# Patient Record
Sex: MALE | Race: WHITE | NOT HISPANIC OR LATINO | Employment: FULL TIME | URBAN - METROPOLITAN AREA
[De-identification: names, ages, dates, MRNs, and addresses within clinical notes are randomized per-mention and may not be internally consistent; named-entity substitution may affect disease eponyms.]

---

## 2018-05-03 ENCOUNTER — NON-PROVIDER VISIT (OUTPATIENT)
Dept: URGENT CARE | Facility: PHYSICIAN GROUP | Age: 33
End: 2018-05-03

## 2018-05-03 DIAGNOSIS — Z02.1 PRE-EMPLOYMENT DRUG SCREENING: ICD-10-CM

## 2018-05-03 LAB
AMP AMPHETAMINE: NORMAL
COC COCAINE: NORMAL
INT CON NEG: NEGATIVE
INT CON POS: POSITIVE
MET METHAMPHETAMINES: NORMAL
OPI OPIATES: NORMAL
PCP PHENCYCLIDINE: NORMAL
POC DRUG COMMENT 753798-OCCUPATIONAL HEALTH: NORMAL
THC: NORMAL

## 2018-05-03 PROCEDURE — 80305 DRUG TEST PRSMV DIR OPT OBS: CPT | Performed by: EMERGENCY MEDICINE

## 2019-12-17 ENCOUNTER — OFFICE VISIT (OUTPATIENT)
Dept: URGENT CARE | Facility: PHYSICIAN GROUP | Age: 34
End: 2019-12-17
Payer: COMMERCIAL

## 2019-12-17 VITALS
WEIGHT: 183 LBS | RESPIRATION RATE: 16 BRPM | HEART RATE: 65 BPM | HEIGHT: 68 IN | SYSTOLIC BLOOD PRESSURE: 104 MMHG | TEMPERATURE: 97.6 F | OXYGEN SATURATION: 99 % | BODY MASS INDEX: 27.74 KG/M2 | DIASTOLIC BLOOD PRESSURE: 76 MMHG

## 2019-12-17 DIAGNOSIS — J06.9 VIRAL URI WITH COUGH: ICD-10-CM

## 2019-12-17 DIAGNOSIS — J02.8 VIRAL SORE THROAT: ICD-10-CM

## 2019-12-17 DIAGNOSIS — B97.89 VIRAL SORE THROAT: ICD-10-CM

## 2019-12-17 LAB
INT CON NEG: NORMAL
INT CON POS: NORMAL
S PYO AG THROAT QL: NEGATIVE

## 2019-12-17 PROCEDURE — 99204 OFFICE O/P NEW MOD 45 MIN: CPT | Performed by: PHYSICIAN ASSISTANT

## 2019-12-17 PROCEDURE — 87880 STREP A ASSAY W/OPTIC: CPT | Performed by: PHYSICIAN ASSISTANT

## 2019-12-17 RX ORDER — FLUTICASONE PROPIONATE 50 MCG
1 SPRAY, SUSPENSION (ML) NASAL DAILY
Qty: 16 G | Refills: 0 | Status: SHIPPED | OUTPATIENT
Start: 2019-12-17 | End: 2021-07-06

## 2019-12-17 RX ORDER — BENZONATATE 100 MG/1
100 CAPSULE ORAL 3 TIMES DAILY PRN
Qty: 60 CAP | Refills: 0 | Status: SHIPPED | OUTPATIENT
Start: 2019-12-17 | End: 2021-07-06

## 2019-12-17 ASSESSMENT — ENCOUNTER SYMPTOMS
COUGH: 1
WHEEZING: 1
SORE THROAT: 1
TROUBLE SWALLOWING: 0
HEADACHES: 0
MYALGIAS: 0
EYE DISCHARGE: 0
VOMITING: 0
FEVER: 0
EYE REDNESS: 0
NAUSEA: 0
SHORTNESS OF BREATH: 0

## 2019-12-17 NOTE — PROGRESS NOTES
Subjective:      Quincy Omer is a 34 y.o. male who presents with Cough (x4days dry cough, congestion, sinus , sore throat)        Cough   This is a new problem. The problem has been unchanged. The problem occurs constantly. The cough is non-productive. Associated symptoms include nasal congestion, a sore throat and wheezing (intermittent). Pertinent negatives include no chest pain, ear pain, eye redness, fever, headaches, myalgias, rash or shortness of breath. Nothing aggravates the symptoms. He has tried OTC cough suppressant for the symptoms. The treatment provided mild relief. There is no history of asthma.   Pharyngitis    This is a new problem. Episode onset: x 4 days ago. Neither side of throat is experiencing more pain than the other. There has been no fever. Associated symptoms include congestion and coughing. Pertinent negatives include no drooling, ear pain, headaches, shortness of breath, trouble swallowing or vomiting. Treatments tried: OTC cough/cold medications.     PMH:  has no past medical history on file.  MEDS: No current outpatient medications on file.  ALLERGIES: No Known Allergies  SURGHX: No past surgical history on file.  SOCHX:  reports that he has quit smoking. He smoked 0.00 packs per day. He has never used smokeless tobacco. He reports current alcohol use.  FH: Family history was reviewed, no pertinent findings to report    Review of Systems   Constitutional: Negative for fever.   HENT: Positive for congestion and sore throat. Negative for drooling, ear pain and trouble swallowing.    Eyes: Negative for discharge and redness.   Respiratory: Positive for cough and wheezing (intermittent). Negative for shortness of breath.    Cardiovascular: Negative for chest pain and leg swelling.   Gastrointestinal: Negative for nausea and vomiting.   Musculoskeletal: Negative for myalgias.   Skin: Negative for rash.   Neurological: Negative for headaches.   All other systems reviewed and are  "negative.         Objective:     /76   Pulse 65   Temp 36.4 °C (97.6 °F) (Temporal)   Resp 16   Ht 1.727 m (5' 8\")   Wt 83 kg (183 lb)   SpO2 99%   BMI 27.83 kg/m²      Physical Exam  Constitutional:       Appearance: Normal appearance.   HENT:      Head: Normocephalic and atraumatic.      Right Ear: Tympanic membrane, ear canal and external ear normal.      Left Ear: Tympanic membrane, ear canal and external ear normal.      Nose: Nose normal.      Mouth/Throat:      Mouth: Mucous membranes are moist.      Pharynx: Oropharynx is clear. Uvula midline. Posterior oropharyngeal erythema present.      Tonsils: No tonsillar exudate.   Eyes:      Extraocular Movements: Extraocular movements intact.      Conjunctiva/sclera: Conjunctivae normal.   Neck:      Musculoskeletal: Normal range of motion and neck supple.   Cardiovascular:      Rate and Rhythm: Normal rate and regular rhythm.      Heart sounds: Normal heart sounds.   Pulmonary:      Effort: Pulmonary effort is normal. No respiratory distress.      Breath sounds: Normal breath sounds. No wheezing.   Musculoskeletal: Normal range of motion.   Skin:     General: Skin is warm and dry.   Neurological:      Mental Status: He is alert and oriented to person, place, and time.            Progress:  POCT Rapid Strep: Negative      Assessment/Plan:     1. Viral URI with cough  - benzonatate (TESSALON) 100 MG Cap; Take 1 Cap by mouth 3 times a day as needed for Cough.  Dispense: 60 Cap; Refill: 0  - fluticasone (FLONASE) 50 MCG/ACT nasal spray; Spray 1 Spray in nose every day.  Dispense: 16 g; Refill: 0    2. Viral sore throat  - POCT Rapid Strep A    Differential diagnoses, supportive care, and indications for immediate follow-up discussed with patient.   Instructed to return to clinic or nearest emergency department for any change in condition, further concerns, or worsening of symptoms.    OTC Tylenol or Motrin for fever/discomfort.  OTC cough/cold medication " for symptomatic relief  OTC Supportive Care for Congestion - saline nasal spray or neti pot  OTC Supportive Care for Sore Throat - warm salt water gargles, sore throat lozenges, warm lemon water, and/or tea.  Drink plenty of fluids  Follow-up with PCP  Work note provided   Return to clinic or go to the ED if symptoms worsen or fail to improve, or if the patient should develop worsening/increasing cough, congestion, ear pain, sore throat, difficulty swallowing, drooling, change in voice, shortness of breath, wheezing, chest pain, fever/chills, and/or any concerning symptoms.    Discussed plan with the patient, and he agrees to the above.

## 2019-12-17 NOTE — LETTER
[facility] [address] [address]     December 17, 2019    Patient: Quincy Omer   YOB: 1985   Date of Visit: 12/17/2019       To Whom It May Concern:    Quincy Omer was seen and treated in our department on 12/17/2019. Please excuse him from work 12/16-12/18.    Sincerely,     Ethel Cunha P.A.-C.

## 2020-09-15 ENCOUNTER — NON-PROVIDER VISIT (OUTPATIENT)
Dept: URGENT CARE | Facility: CLINIC | Age: 35
End: 2020-09-15

## 2020-09-15 DIAGNOSIS — Z02.1 PRE-EMPLOYMENT DRUG SCREENING: ICD-10-CM

## 2020-09-15 LAB
AMP AMPHETAMINE: NORMAL
BAR BARBITURATES: NORMAL
BZO BENZODIAZEPINES: NORMAL
COC COCAINE: NORMAL
INT CON NEG: NEGATIVE
INT CON POS: POSITIVE
MDMA ECSTASY: NORMAL
MET METHAMPHETAMINES: NORMAL
MTD METHADONE: NORMAL
OPI OPIATES: NORMAL
OXY OXYCODONE: NORMAL
PCP PHENCYCLIDINE: NORMAL
POC URINE DRUG SCREEN OCDRS: NORMAL
THC: NORMAL

## 2020-09-15 PROCEDURE — 80305 DRUG TEST PRSMV DIR OPT OBS: CPT | Performed by: PHYSICIAN ASSISTANT

## 2020-09-21 ENCOUNTER — NON-PROVIDER VISIT (OUTPATIENT)
Dept: OCCUPATIONAL MEDICINE | Facility: CLINIC | Age: 35
End: 2020-09-21

## 2020-09-21 DIAGNOSIS — Z02.83 ENCOUNTER FOR DRUG SCREENING: ICD-10-CM

## 2020-09-21 PROCEDURE — 8911 PR MRO FEE: Performed by: NURSE PRACTITIONER

## 2020-11-11 ENCOUNTER — OFFICE VISIT (OUTPATIENT)
Dept: URGENT CARE | Facility: PHYSICIAN GROUP | Age: 35
End: 2020-11-11
Payer: COMMERCIAL

## 2020-11-11 ENCOUNTER — OCCUPATIONAL MEDICINE (OUTPATIENT)
Dept: URGENT CARE | Facility: PHYSICIAN GROUP | Age: 35
End: 2020-11-11
Payer: COMMERCIAL

## 2020-11-11 VITALS
SYSTOLIC BLOOD PRESSURE: 100 MMHG | DIASTOLIC BLOOD PRESSURE: 82 MMHG | TEMPERATURE: 97.6 F | OXYGEN SATURATION: 97 % | HEIGHT: 68 IN | BODY MASS INDEX: 27.28 KG/M2 | WEIGHT: 180 LBS | HEART RATE: 56 BPM

## 2020-11-11 DIAGNOSIS — S61.011A LACERATION OF RIGHT THUMB WITHOUT FOREIGN BODY WITHOUT DAMAGE TO NAIL, INITIAL ENCOUNTER: ICD-10-CM

## 2020-11-11 DIAGNOSIS — S65.411A LACERATION OF BLOOD VESSEL OF RIGHT THUMB, INITIAL ENCOUNTER: ICD-10-CM

## 2020-11-11 PROCEDURE — 99202 OFFICE O/P NEW SF 15 MIN: CPT | Performed by: FAMILY MEDICINE

## 2020-11-11 ASSESSMENT — ENCOUNTER SYMPTOMS
TINGLING: 0
FOCAL WEAKNESS: 0
SENSORY CHANGE: 0

## 2020-11-11 NOTE — PATIENT INSTRUCTIONS
Suture Removal  You have had your sutures (stitches) removed today. This means your wound has healed well enough to take out your stitches. Be careful to protect the wound area over the next several weeks. An injury this area could cause the cut to split open again. It usually takes 1-2 years for a scar to get its full strength and loose its redness. For wounds that heal slowly, tapes may be applied to reinforce the skin for several days after the stitches are removed.  You may allow the sutured area to get wet. Topical antibiotics (antibiotics you put on your skin) are not usually needed at this point. Applying vitamin E oil and aloe vera ointments may help the wound heal faster and stronger. Some scars form extra pigment with exposure to sunlight during the first 6-12 months after repair. This can be prevented by using a sun block (SPF 15-30) on the affected area. Call your doctor if you have any concerns about your injury. Call right away if you have any evidence of wound infection such as increased pain, drainage, redness, or swelling.  Document Released: 01/25/2006 Document Revised: 03/11/2013 Document Reviewed: 10/09/2009  MyWave® Patient Information ©2013 [employer].

## 2020-11-11 NOTE — LETTER
EMPLOYEE’S CLAIM FOR COMPENSATION/ REPORT OF INITIAL TREATMENT  FORM C-4    EMPLOYEE’S CLAIM - PROVIDE ALL INFORMATION REQUESTED   First Name  uQincy Last Name  [place] Birthdate                    1985                Sex  male Claim Number   Home Address  [address] Age  35 y.o. Height   Weight   SSN     [facility] Zip  [address] Telephone  [phone] (home)    Mailing Address  [address] [facility] Zip  [address] Primary Language Spoken  English    Insurer   Third Party   [employer]   Employee's Occupation (Job Title) When Injury or Occupational Disease Occurred      Employer's Name  [employer]  Telephone  [phone]    Employer Address  [address]  [place]  Zip  [address]    Date of Injury  10/30/2020               Hour of Injury  7:30 PM Date Employer Notified  10/30/2020 Last Day of Work after Injury     or Occupational Disease  11/11/2020 Supervisor to Whom Injury     Reported  Oscar   Address or Location of Accident (if applicable)  [[facility] [address]]   What were you doing at the time of accident? (if applicable)  deburring a piece of steel    How did this injury or occupational disease occur? (Be specific an answer in detail. Use additional sheet if necessary)  while I was deburring a piece of steel the end of the debur knife slipped and my hand rubbed against the steel cutting it   If you believe that you have an occupational disease, when did you first have knowledge of the disability and it relationship to your employment?  N/A Witnesses to the Accident  none      Nature of Injury or Occupational Disease  Laceration  Part(s) of Body Injured or Affected  Hand (R), Thumb (R), Thumb (R)    I certify that the above is true and correct to the best of my knowledge and that I have provided this information in order  to obtain the benefits of [place] or [place]  I hereby authorize any physician, chiropractor, surgeon, practitioner, or other person, any hospital, including [facility] or [facility], any medical service organization, any insurance company, or other institution or organization to release to each other, any medical or other information, including benefits paid or payable, pertinent to this injury or disease, except information relative to diagnosis, treatment and/or counseling for AIDS, psychological conditions, alcohol or controlled substances, for which I must give specific authorization.  A Photostat of this authorization shall be as valid as the original.     Date   Place   Employee’s Signature   THIS REPORT MUST BE COMPLETED AND MAILED WITHIN 3 WORKING DAYS OF TREATMENT   Place  [facility]  Name of Facility  [place]   Date  11/11/2020 Diagnosis  (S65.411A) Laceration of blood vessel of right thumb, initial encounter Is there evidence the injured employee was under the              influence of alcohol and/or another controlled substance at the time of accident?   Hour  11:03 AM Description of Injury or Disease  The encounter diagnosis was Laceration of blood vessel of right thumb, initial encounter. No   Treatment  Wound closure with suturing performed on 10/30/2020  Have you advised the patient to remain off work five days or     more? No   X-Ray Findings    Comments:Not applicable   If Yes   From Date  To Date      From information given by the employee, together with medical evidence, can you directly connect this injury or occupational disease as job incurred?  Yes If No Full Duty    Yes Modified Duty      Is additional medical care by a physician indicated?  No If Modified Duty, Specify any Limitations / Restrictions      Do you know of any previous injury or disease contributing  "to this condition or occupational disease?                            No   Date  11/11/2020 Print Doctor’s Name   [facility] I certify the employer’s copy of  this form was mailed on:   Address  [address] Insurer’s Use Only     [facility] Zip  [address]    Provider’s Tax ID Number  [id number] Telephone  Dept: [phone]      ASIF Blanc M.D.  Signature:     Degree          ORIGINAL-TREATING PHYSICIAN OR CHIROPRACTOR    PAGE 2-INSURER/TPA    PAGE 3-EMPLOYER    PAGE 4-EMPLOYEE        Form C-4 (rev.10/07)          BRIEF DESCRIPTION OF RIGHTS AND BENEFITS  (Pursuant to [place] 616C.050)    Notice of Injury or Occupational Disease (Incident Report Form C-1): If an injury or occupational disease (OD) arises out of and in the course of employment, you must provide written notice to your employer as soon as practicable, but no later than 7 days after the accident or OD. Your employer shall maintain a sufficient supply of the required forms.     Claim for Compensation (Form C-4): If medical treatment is sought, the form C-4 is available at the place of initial treatment. A completed \"Claim for Compensation\" (Form C-4) must be filed within 90 days after an accident or OD. The treating physician or chiropractor must, within 3 working days after treatment, complete and mail to the employer, the employer's insurer and third-party , the Claim for Compensation.     Medical Treatment: If you require medical treatment for your on-the-job injury or OD, you may be required to select a physician or chiropractor from a list provided by your workers’ compensation insurer, if it has contracted with an Organization for Managed Care (MCO) or Preferred Provider Organization (PPO) or providers of health care. If your employer has not entered into a contract with an MCO or PPO, you may select a physician or chiropractor from the Panel of Physicians and Chiropractors. Any medical costs related " to your industrial injury or OD will be paid by your insurer.     Temporary Total Disability (TTD): If your doctor has certified that you are unable to work for a period of at least 5 consecutive days, or 5 cumulative days in a 20-day period, or places restrictions on you that your employer does not accommodate, you may be entitled to TTD compensation.     Temporary Partial Disability (TPD): If the wage you receive upon reemployment is less than the compensation for TTD to which you are entitled, the insurer may be required to pay you TPD compensation to make up the difference. TPD can only be paid for a maximum of 24 months.     Permanent Partial Disability (PPD): When your medical condition is stable and there is an indication of a PPD as a result of your injury or OD, within 30 days, your insurer must arrange for an evaluation by a rating physician or chiropractor to determine the degree of your PPD. The amount of your PPD award depends on the date of injury, the results of the PPD evaluation and your age and wage.     Permanent Total Disability (PTD): If you are medically certified by a treating physician or chiropractor as permanently and totally disabled and have been granted a PTD status by your insurer, you are entitled to receive monthly benefits not to exceed 66 2/3% of your average monthly wage. The amount of your PTD payments is subject to reduction if you previously received a PPD award.     Vocational Rehabilitation Services: You may be eligible for vocational rehabilitation services if you are unable to return to the job due to a permanent physical impairment or permanent restrictions as a result of your injury or occupational disease.     Transportation and Per Yessi Reimbursement: You may be eligible for travel expenses and per yessi associated with medical treatment.     Reopening: You may be able to reopen your claim if your condition worsens after claim closure.     Appeal Process: If you disagree  with a written determination issued by the insurer or the insurer does not respond to your request, you may appeal to the Department of Administration, , by following the instructions contained in your determination letter. You must appeal the determination within 70 days from the date of the determination letter at [address], [address], [place], [place] [address], or [address], [address], [place], [place] [address]. If you disagree with the  decision, you may appeal to the Department of Administration, . You must file your appeal within 30 days from the date of the  decision letter at [address], [address], [place], [place] [address], or [address], [address], [place], [place] [address]. If you disagree with a decision of an , you may file a petition for judicial review with the District Court. You must do so within 30 days of the Appeal Officer’s decision. You may be represented by an  at your own expense or you may contact the [facility] for possible representation.     [place]  for Injured Workers (NAIW): If you disagree with a  decision, you may request that NAIW represent you without charge at an  Hearing. For information regarding denial of benefits, you may contact the [facility] at: [address], [address], [place] [address], [phone], or [address], [address], [place] [address], [phone]     To File a Complaint with the Division: If you wish to file a complaint with the  of the Division of Industrial Relations (DIR), please contact the Workers’ Compensation Section, [address], [place], [place] [address], telephone [phone], or [address], [place], [place] [address], telephone [phone].     For assistance with Workers’ Compensation Issues: You may contact the  Office of the Governor Consumer Health Assistance, [address] [place] [address], [address], [place] [address], Toll Free 1-769.854.7406, Web site: http://govcha.[facility].[email or web address]., E-mail [email or web address].              __________________________________________________________________                              ___________________         Employee Name / Signature                                                                                                                     Date                                                                                                                                                                                       D-2 (rev. 01/20)

## 2020-11-11 NOTE — LETTER
[facility]  [address]  [place] [address]  Phone:  [phone] - Fax:  [phone]   [facility] Progress Report and Disability Certification  Date of Service: 11/11/2020   No Show:  No  Date / Time of Next Visit:     Claim Information   Patient Name: Quincy Omer  Claim Number:     Employer: [employer]  Date of Injury: 10/30/2020     Insurer / TPA: Kleli Torrez  ID / SSN:     Occupation:   Diagnosis: The encounter diagnosis was Laceration of blood vessel of right thumb, initial encounter.    Medical Information   Related to Industrial Injury?      Subjective Complaints:  Date of injury 10/31/2020.  35-year-old messiness presents with a chief complaint of right thumb laceration sustained on 10/30/2020 while deburring a piece of steel and the deeper knife slipped and struck the dorsal aspect of the right thumb.  Employee was initially evaluated at the [facility] emergency department and wound closure was performed with 3 sutures on 10/30/2020.  Employee denies functional limitations use of the right hand or thumb at this time and has been performing full duty with no limitations.   Objective Findings: Right thumb: 1.5 cm dorsal laceration, clean, dry, 3 sutures intact.  Full range of motion to flexion and extension at the MCP and IP joint of the right thumb, neurovascular intact throughout.   Pre-Existing Condition(s):     Assessment:   Condition Improved    Status: Discharged /  MMI  Permanent Disability:     Plan:   Comments:Suture removal, discharge from care    Diagnostics:   Comments:Not applicable    Comments:       Disability Information   Status: Released to Full Duty    From:     Through:   Restrictions are:     Physical Restrictions   Sitting:    Standing:    Stooping:    Bending:      Squatting:    Walking:    Climbing:    Pushing:      Pulling:    Other:    Reaching Above Shoulder (L):   Reaching Above  Shoulder (R):       Reaching Below Shoulder (L):    Reaching Below Shoulder (R):      Not to exceed Weight Limits   Carrying(hrs):   Weight Limit(lb):   Lifting(hrs):   Weight  Limit(lb):     Comments:      Repetitive Actions   Hands: i.e. Fine Manipulations from Grasping:     Feet: i.e. Operating Foot Controls:     Driving / Operate Machinery:     [facility] Phys[facility] Signature:  Physician Name:     Clinic Name / Location: [facility] [address] [address] Clinic Phone Number: Dept: [phone]   Appointment Time: 11:05 Am Visit Start Time: 11:03 AM   Check-In Time:  11:01 Am Visit Discharge Time:     Original-Treating Physician or Chiropractor    Page 2-Insurer/TPA    Page 3-Employer    Page 4-Employee

## 2020-11-11 NOTE — PROCEDURES
Suture Removal    Date/Time: 11/11/2020 11:07 AM  Performed by: Glen Chun M.D.  Authorized by: Glen Chun M.D.   Location: Right dorsal thumb.  Wound Appearance: clean  Sutures Removed: 3  Patient tolerance: patient tolerated the procedure well with no immediate complications

## 2020-11-11 NOTE — PROGRESS NOTES
Subjective:   Quincy Omer is a 35 y.o. male who presents for No chief complaint on file.    Date of injury 10/31/2020.  35-year-old messiness presents with a chief complaint of right thumb laceration sustained on 10/30/2020 while deburring a piece of steel and the deeper knife slipped and struck the dorsal aspect of the right thumb.  Employee was initially evaluated at the [facility] emergency department and wound closure was performed with 3 sutures on 10/30/2020.  Employee denies functional limitations use of the right hand or thumb at this time and has been performing full duty with no limitations.   See the C4 and D 39 forms    PMH:  has no past medical history on file.  MEDS:   Current Outpatient Medications:   •  benzonatate (TESSALON) 100 MG Cap, Take 1 Cap by mouth 3 times a day as needed for Cough. (Patient not taking: Reported on 11/11/2020), Disp: 60 Cap, Rfl: 0  •  fluticasone (FLONASE) 50 MCG/ACT nasal spray, Spray 1 Spray in nose every day. (Patient not taking: Reported on 11/11/2020), Disp: 16 g, Rfl: 0  ALLERGIES: No Known Allergies  SURGHX: No past surgical history on file.  SOCHX:  reports that he has quit smoking. He smoked 0.00 packs per day. He has never used smokeless tobacco. He reports current alcohol use. He reports current drug use. Drug: Marijuana.  FH: No family history on file.  Review of Systems   Neurological: Negative for tingling, sensory change and focal weakness.   All other systems reviewed and are negative.       Objective:   There were no vitals taken for this visit.  Physical Exam  Vitals signs and nursing note reviewed.   Constitutional:       General: He is not in acute distress.     Appearance: He is well-developed.   HENT:      Head: Normocephalic and atraumatic.      Right Ear: External ear normal.      Left Ear: External ear normal.      Nose: Nose normal.      Mouth/Throat:      Mouth: Mucous membranes are moist.   Eyes:      Conjunctiva/sclera:  Conjunctivae normal.   Cardiovascular:      Rate and Rhythm: Normal rate.   Pulmonary:      Effort: Pulmonary effort is normal. No respiratory distress.      Breath sounds: Normal breath sounds.   Abdominal:      General: There is no distension.   Musculoskeletal: Normal range of motion.   Skin:     General: Skin is warm and dry.   Neurological:      General: No focal deficit present.      Mental Status: He is alert and oriented to person, place, and time. Mental status is at baseline.      Gait: Gait (gait at baseline) normal.   Psychiatric:         Judgment: Judgment normal.       Right thumb: 1.5 cm dorsal laceration, clean, dry, 3 sutures intact.  Full range of motion to flexion and extension at the MCP and IP joint of the right thumb, neurovascular intact throughout.   Assessment/Plan:   1. Laceration of blood vessel of right thumb, initial encounter  - Suture Removal    See the D 39 and C4 forms, release from care to full duty        Please note that this dictation was created using voice recognition software. I have worked with consultants from the vendor as well as technical experts from [facility] to optimize the interface. I have made every reasonable attempt to correct obvious errors, but I expect that there are errors of grammar and possibly content that I did not discover before finalizing the note.

## 2021-07-03 ENCOUNTER — OFFICE VISIT (OUTPATIENT)
Dept: URGENT CARE | Facility: PHYSICIAN GROUP | Age: 36
End: 2021-07-03
Payer: COMMERCIAL

## 2021-07-03 VITALS
BODY MASS INDEX: 26.37 KG/M2 | OXYGEN SATURATION: 95 % | WEIGHT: 174 LBS | HEART RATE: 62 BPM | DIASTOLIC BLOOD PRESSURE: 74 MMHG | HEIGHT: 68 IN | SYSTOLIC BLOOD PRESSURE: 128 MMHG | RESPIRATION RATE: 16 BRPM | TEMPERATURE: 97.4 F

## 2021-07-03 DIAGNOSIS — N28.89 LEFT KIDNEY MASS: ICD-10-CM

## 2021-07-03 DIAGNOSIS — R10.9 LEFT FLANK PAIN: ICD-10-CM

## 2021-07-03 DIAGNOSIS — R10.9 ABDOMINAL PAIN, UNSPECIFIED ABDOMINAL LOCATION: ICD-10-CM

## 2021-07-03 LAB
APPEARANCE UR: CLEAR
BILIRUB UR STRIP-MCNC: NEGATIVE MG/DL
COLOR UR AUTO: YELLOW
GLUCOSE UR STRIP.AUTO-MCNC: NEGATIVE MG/DL
KETONES UR STRIP.AUTO-MCNC: NEGATIVE MG/DL
LEUKOCYTE ESTERASE UR QL STRIP.AUTO: NEGATIVE
NITRITE UR QL STRIP.AUTO: NEGATIVE
PH UR STRIP.AUTO: 7 [PH] (ref 5–8)
PROT UR QL STRIP: NEGATIVE MG/DL
RBC UR QL AUTO: NEGATIVE
SP GR UR STRIP.AUTO: 1.01
UROBILINOGEN UR STRIP-MCNC: 0.2 MG/DL

## 2021-07-03 PROCEDURE — 81002 URINALYSIS NONAUTO W/O SCOPE: CPT | Performed by: PHYSICIAN ASSISTANT

## 2021-07-03 PROCEDURE — 99214 OFFICE O/P EST MOD 30 MIN: CPT | Performed by: PHYSICIAN ASSISTANT

## 2021-07-03 RX ORDER — HYDROXYZINE 50 MG/1
50 TABLET, FILM COATED ORAL 3 TIMES DAILY PRN
COMMUNITY

## 2021-07-03 RX ORDER — COVID-19 MOLECULAR TEST ASSAY
KIT MISCELLANEOUS
COMMUNITY
Start: 2021-05-25 | End: 2021-07-06

## 2021-07-03 RX ORDER — TRAZODONE HYDROCHLORIDE 100 MG/1
100 TABLET ORAL
COMMUNITY
Start: 2021-06-15

## 2021-07-03 RX ORDER — LORAZEPAM 1 MG/1
TABLET ORAL
COMMUNITY
Start: 2021-06-15 | End: 2021-07-06

## 2021-07-03 RX ORDER — SERTRALINE HYDROCHLORIDE 100 MG/1
TABLET, FILM COATED ORAL
COMMUNITY
Start: 2021-06-21

## 2021-07-05 ENCOUNTER — HOSPITAL ENCOUNTER (OUTPATIENT)
Dept: RADIOLOGY | Facility: MEDICAL CENTER | Age: 36
End: 2021-07-05
Attending: PHYSICIAN ASSISTANT
Payer: COMMERCIAL

## 2021-07-05 DIAGNOSIS — R10.9 ABDOMINAL PAIN, UNSPECIFIED ABDOMINAL LOCATION: ICD-10-CM

## 2021-07-05 PROCEDURE — 74176 CT ABD & PELVIS W/O CONTRAST: CPT

## 2021-07-06 ASSESSMENT — ENCOUNTER SYMPTOMS
SENSORY CHANGE: 0
FOCAL WEAKNESS: 0
SHORTNESS OF BREATH: 0
BACK PAIN: 1
ROS GI COMMENTS: NO FECAL INCONTINENCE
WEAKNESS: 0

## 2021-07-06 NOTE — PROGRESS NOTES
"Subjective:   Quincy Omer is a 36 y.o. male who presents for Flank Pain (kidney pain, x2 days. sharp and stabbing yesterday, dull today when taking in deep breaths. )      Patient is a 36-year-old male who presents for evaluation of left flank pain for 2 days.  He states that the pain started in the middle the night waking him up from sleep.  The pain comes and goes in intensity and today he feels much better.  He has no history of kidney stones or other back issues.  He has tried ibuprofen and Tylenol with mild relief.      Review of Systems   Respiratory: Negative for shortness of breath.    Cardiovascular: Negative for chest pain.   Gastrointestinal:        No fecal incontinence   Genitourinary:        No urinary retention/incontinance.   Musculoskeletal: Positive for back pain.   Neurological: Negative for sensory change, focal weakness and weakness.        Able to walk       Medications:    • benzonatate Caps  • fluticasone  • hydrOXYzine HCl Tabs  • ID Now COVID-19 Kit  • LORazepam Tabs  • sertraline Tabs  • traZODone Tabs    Allergies: Patient has no known allergies.    Problem List: Quincy Omer does not have a problem list on file.    Surgical History:  No past surgical history on file.    Past Social Hx: Quincy Omer  reports that he has quit smoking. He smoked 0.00 packs per day. He has never used smokeless tobacco. He reports current alcohol use. He reports current drug use. Drug: Marijuana.     Past Family Hx:  Quincy Omer family history is not on file.     Problem list, medications, and allergies reviewed by myself today in Epic.     Objective:     Blood Pressure 128/74   Pulse 62   Temperature 36.3 °C (97.4 °F) (Temporal)   Respiration 16   Height 1.727 m (5' 8\")   Weight 78.9 kg (174 lb)   Oxygen Saturation 95%   Body Mass Index 26.46 kg/m²     Physical Exam  Vitals reviewed.   Constitutional:       General: He is not in acute distress.     Appearance: He is " well-developed. He is not diaphoretic.   Cardiovascular:      Rate and Rhythm: Normal rate and regular rhythm.      Pulses: Normal pulses.           Dorsalis pedis pulses are 2+ on the right side and 2+ on the left side.        Posterior tibial pulses are 2+ on the right side and 2+ on the left side.      Heart sounds: Normal heart sounds.   Pulmonary:      Effort: Pulmonary effort is normal. No respiratory distress.      Breath sounds: Normal breath sounds. No wheezing or rales.   Chest:      Chest wall: No tenderness.   Abdominal:      General: Bowel sounds are normal. There is no distension.      Palpations: Abdomen is soft. There is no mass.      Tenderness: There is no abdominal tenderness. There is no guarding or rebound.      Hernia: No hernia is present.   Musculoskeletal:         General: Tenderness present. Normal range of motion.      Cervical back: Normal range of motion and neck supple.      Comments: No pain to palpation of the left flank.  No focal midline tenderness of the spine.  Flexion, extension, rotation and lateral bend of back limited by pain.     Skin:     General: Skin is warm and dry.      Capillary Refill: Capillary refill takes less than 2 seconds.      Findings: No rash.   Neurological:      Mental Status: He is alert and oriented to person, place, and time.      Sensory: No sensory deficit.      Motor: No abnormal muscle tone.      Coordination: Coordination normal.      Deep Tendon Reflexes: Reflexes normal.      Comments: Bilateral lower extremity strength and sensory intact.  Negative straight leg raise.  Knee and ankle reflexes intact.     Psychiatric:         Behavior: Behavior normal.         Thought Content: Thought content normal.         Judgment: Judgment normal.          7/5/2021 11:38 AM     HISTORY/REASON FOR EXAM:  Flank pain, kidney stone suspected; renal colic.  Left flank pain     TECHNIQUE/EXAM DESCRIPTION AND NUMBER OF VIEWS:  CT scan renal/colic without  contrast.     5 mm helical images of the abdomen and pelvis were obtained from the diaphragmatic domes through the pubic symphysis.     Low dose optimization technique was utilized for this CT exam including automated exposure control and adjustment of the mA and/or kV according to patient size.     COMPARISON: None.     FINDINGS:  Lung bases: Unremarkable.     Kidneys: There is abnormal contour of the left kidney with a subtle heterogeneous mass measuring approximately 4.1 x 4.1 x 3.8 cm. There is surrounding perinephric stranding. No hydronephrosis or hydroureter. No renal or ureteral calculi.     Bladder: Unremarkable     Liver: Unremarkable.     Spleen: The spleen measures approximately 14 cm. A splenule is in the left upper quadrant.     Adrenal glands: Unremarkable.     Pancreas: Unremarkable.     Biliary system: Unremarkable.     Bowel: Unremarkable. No pneumoperitoneum is identified.     Ascites: None.     Lymph nodes: No adenopathy.     Bones: Unremarkable.     Miscellaneous:     IMPRESSION:     1.  Subtle heterogeneous mass in the upper pole of the left kidney does not have attenuation characteristics suggestive of abscess. Findings are concerning for neoplasm. Dedicated renal protocol CT or MRI is recommended for further evaluation.     2.  No adenopathy is identified. No contour abnormalities appreciated in the left renal vein.     3.  Mild splenomegaly  Assessment/Plan:     Medical Decision Making/Comments     Patient is a 36-year-old male who presents for acute left flank pain for 2 days.  Exam is unremarkable UA is unremarkable.  Given that the pain woke him up in the middle the night a CT scan was done to rule out kidney stones.  No stones were found but unfortunately there appears to be a 4 x 4 by 4 cm mass on the left kidney.  We will refer to the intake oncology coordinator for further evaluation and transfer of care.   Diagnosis and associated orders     1. Left flank pain  POCT Urinalysis     REFERRAL TO INTAKE ONCOLOGY COORDINATOR   2. Abdominal pain, unspecified abdominal location  CT-RENAL COLIC EVALUATION(A/P W/O)    REFERRAL TO INTAKE ONCOLOGY COORDINATOR   3. Left kidney mass  REFERRAL TO INTAKE ONCOLOGY COORDINATOR            Billing note: at least 30 minutes was allotted and spent for face-to-face care with the patient as well as coordination of care such as preparing for the visit, obtaining/reviewing history, reconciling outside information, performing an exam/evaluation, reviewing unique external notes and test reuslts, ordering/interpreting diagnostics, ordering/administering treatments, re-evaluating the patient, collaborating/communicating with other healthcare professionals, developing a plan of care, counseling/educating the patient/caregivers/family members, updating the medical record, and ensuring accurate documentation.     Differential diagnosis, natural history, supportive care, and indications for immediate follow-up discussed.    Advised the patient to follow-up with the primary care physician for recheck, reevaluation, and consideration of further management.    Please note that this dictation was created using voice recognition software. I have made a reasonable attempt to correct obvious errors, but I expect that there are errors of grammar and possibly content that I did not discover before finalizing the note.

## 2021-07-12 ENCOUNTER — HOSPITAL ENCOUNTER (OUTPATIENT)
Dept: RADIOLOGY | Facility: MEDICAL CENTER | Age: 36
End: 2021-07-12
Attending: NURSE PRACTITIONER
Payer: COMMERCIAL

## 2021-07-12 ENCOUNTER — OFFICE VISIT (OUTPATIENT)
Dept: HEMATOLOGY ONCOLOGY | Facility: MEDICAL CENTER | Age: 36
End: 2021-07-12
Payer: COMMERCIAL

## 2021-07-12 VITALS
WEIGHT: 176.7 LBS | RESPIRATION RATE: 17 BRPM | HEART RATE: 66 BPM | TEMPERATURE: 98.5 F | SYSTOLIC BLOOD PRESSURE: 120 MMHG | OXYGEN SATURATION: 98 % | DIASTOLIC BLOOD PRESSURE: 74 MMHG | BODY MASS INDEX: 26.17 KG/M2 | HEIGHT: 69 IN

## 2021-07-12 DIAGNOSIS — N28.89 RENAL MASS: ICD-10-CM

## 2021-07-12 PROCEDURE — 99204 OFFICE O/P NEW MOD 45 MIN: CPT | Performed by: NURSE PRACTITIONER

## 2021-07-12 PROCEDURE — 74170 CT ABD WO CNTRST FLWD CNTRST: CPT

## 2021-07-12 PROCEDURE — 700117 HCHG RX CONTRAST REV CODE 255: Performed by: NURSE PRACTITIONER

## 2021-07-12 RX ADMIN — IOHEXOL 100 ML: 350 INJECTION, SOLUTION INTRAVENOUS at 16:30

## 2021-07-12 ASSESSMENT — ENCOUNTER SYMPTOMS
NAUSEA: 1
BACK PAIN: 1
SORE THROAT: 0
SHORTNESS OF BREATH: 0
DIAPHORESIS: 0
WHEEZING: 0
NERVOUS/ANXIOUS: 1
DIARRHEA: 0
ABDOMINAL PAIN: 0
FEVER: 0
COUGH: 1
VOMITING: 0
WEIGHT LOSS: 1
CHILLS: 1
PALPITATIONS: 0
SPUTUM PRODUCTION: 0
DEPRESSION: 1
CONSTIPATION: 0
DIZZINESS: 0
HEADACHES: 1
FLANK PAIN: 1

## 2021-07-12 ASSESSMENT — PATIENT HEALTH QUESTIONNAIRE - PHQ9
SUM OF ALL RESPONSES TO PHQ QUESTIONS 1-9: 5
5. POOR APPETITE OR OVEREATING: 0 - NOT AT ALL
CLINICAL INTERPRETATION OF PHQ2 SCORE: 2

## 2021-07-12 ASSESSMENT — PAIN SCALES - GENERAL: PAINLEVEL: 3=SLIGHT PAIN

## 2021-07-12 NOTE — PROGRESS NOTES
"Subjective:      Quincy Omer is a 36 y.o. male who presents with New Patient (IC New/kidney mass)          HPI    Patient referred to me, Intake Oncology Coordinator by Urgent Care Provider  for renal mass.  Patient is unaccompanied for today's visit.    Patient stated on 7/1 he was awakened from severe left back pain as well as left upper quadrant abdominal pain.  He stated that this pain was \"the worst pain in my life.\"  He was seen in urgent care on 7/3 for further evaluation due to concern for possible kidney stone.  He stated at the time he was seen in urgent care the pain had somewhat subsided, describing the pain now as a \"real bad ache.\"  He stated the pain is more on the left flank side, and every once well may have some discomfort in the left upper quadrant abdomen.  Urgent care provider sent patient for a CT renal colic evaluation which he completed on 7/5/2021.  That CT showed a subtle heterogeneous mass in the upper pole of the left kidney which did not have any characteristics is just an abscess.  The findings were concerning for a neoplasm and a dedicated renal protocol CT or MRI was recommended for further evaluation.  There was no adenopathy identified.  Patient did have mild splenomegaly.  A UA was completed which was found to be negative.  Patient was referred for further evaluation of the abnormal CT.  I personally reviewed the CT report as well as the images in detail, and reviewed both of them with the patient today.    Clinically patient does continue to have pain as discussed above.  He has noticed approximately 11 pound weight loss in the last 1-1/2 months which is unplanned.  He is noticing some chills more recently which is mild, as well as increase in fatigue.  He notes a mild cough, and some chest pain every once in a while.  He also notes of an underlying nausea, but is still able to eat.  Patient with history of depression and anxiety, and was recently seen in our local " psychiatric[facility]mental hospital approximately 1 month ago.  He is on antidepressive medication, prescribed to him by a psychiatrist.    Please see past medical and surgical history below.    Patient does note a family history of cancer in his maternal grandmother diagnosed with breast cancer.    Patient is a former cigarette smoker.  He quit in  where he smoked for approximately 15 years on average of three-quarter pack per day.  Patient is also a former chewing tobacco user, quit in .  Patient also is a former electronic cigarette user in which she did use nicotine and flavoring.  He does currently use marijuana.    No Known Allergies  Current Outpatient Medications on File Prior to Visit   Medication Sig Dispense Refill   • sertraline (ZOLOFT) 100 MG Tab      • traZODone (DESYREL) 100 MG Tab Take 100 mg by mouth.     • hydrOXYzine HCl (ATARAX) 50 MG Tab Take 50 mg by mouth 3 times a day as needed for Itching.       No current facility-administered medications on file prior to visit.     Past Medical History:   Diagnosis Date   • IBS (irritable bowel syndrome)      History reviewed. No pertinent surgical history.    Family History   Problem Relation Age of Onset   • Cancer Maternal Grandmother         Breast cancer       Social History     Socioeconomic History   • Marital status:      Spouse name: Not on file   • Number of children: Not on file   • Years of education: Not on file   • Highest education level: Not on file   Occupational History   • Not on file   Tobacco Use   • Smoking status: Former Smoker     Packs/day: 0.75     Years: 15.00     Pack years: 11.25     Types: Cigarettes     Quit date: 2016     Years since quittin.0   • Smokeless tobacco: Former User     Types: Chew     Quit date: 2010   Vaping Use   • Vaping Use: Former   • Substances: Nicotine, THC, Flavoring, Flavoring with the nicotine - no longer use nicotine vapes   Substance and Sexual Activity   • Alcohol use: Yes      Comment: occasionally   • Drug use: Yes     Types: Marijuana   • Sexual activity: Yes   Other Topics Concern   • Not on file   Social History Narrative         Social Determinants of Health     Financial Resource Strain:    • Difficulty of Paying Living Expenses:    Food Insecurity:    • Worried About Running Out of Food in the Last Year:    • Ran Out of Food in the Last Year:    Transportation Needs:    • Lack of Transportation (Medical):    • Lack of Transportation (Non-Medical):    Physical Activity:    • Days of Exercise per Week:    • Minutes of Exercise per Session:    Stress:    • Feeling of Stress :    Social Connections:    • Frequency of Communication with Friends and Family:    • Frequency of Social Gatherings with Friends and Family:    • Attends Zoroastrianism Services:    • Active Member of Clubs or Organizations:    • Attends Club or Organization Meetings:    • Marital Status:    Intimate Partner Violence:    • Fear of Current or Ex-Partner:    • Emotionally Abused:    • Physically Abused:    • Sexually Abused:          Review of Systems   Constitutional: Positive for chills (mild ), malaise/fatigue and weight loss (11 pounds in 1.5 months - unplanned). Negative for diaphoresis and fever.   HENT: Positive for congestion. Negative for sore throat.    Respiratory: Positive for cough (mild). Negative for sputum production, shortness of breath and wheezing.    Cardiovascular: Positive for chest pain. Negative for palpitations.   Gastrointestinal: Positive for nausea. Negative for abdominal pain (resolved LUQ pain), constipation, diarrhea and vomiting.   Genitourinary: Positive for flank pain (left ). Negative for dysuria and hematuria.   Musculoskeletal: Positive for back pain (left back/flank pain).   Skin: Positive for itching (Athletes foot - improving with Tinactin). Negative for rash.   Neurological: Positive for headaches. Negative for dizziness.   Psychiatric/Behavioral: Positive for  "depression. The patient is nervous/anxious.           Objective:     /74   Pulse 66   Temp 36.9 °C (98.5 °F) (Temporal)   Resp 17   Ht 1.753 m (5' 9\")   Wt 80.2 kg (176 lb 11.2 oz)   SpO2 98%   BMI 26.09 kg/m²      Physical Exam  Vitals reviewed.   Constitutional:       General: He is not in acute distress.     Appearance: Normal appearance. He is well-developed. He is not diaphoretic.   HENT:      Head: Normocephalic and atraumatic.   Eyes:      General: No scleral icterus.        Right eye: No discharge.         Left eye: No discharge.      Conjunctiva/sclera: Conjunctivae normal.      Pupils: Pupils are equal, round, and reactive to light.   Neck:      Thyroid: No thyromegaly.   Cardiovascular:      Rate and Rhythm: Normal rate and regular rhythm.      Pulses: Normal pulses.      Heart sounds: Normal heart sounds. No murmur heard.   No friction rub. No gallop.    Pulmonary:      Effort: Pulmonary effort is normal. No respiratory distress.      Breath sounds: Normal breath sounds. No wheezing.   Abdominal:      General: Bowel sounds are normal. There is no distension.      Palpations: Abdomen is soft.      Tenderness: There is abdominal tenderness (minimal discomfort upon palpation of the LUQ).   Musculoskeletal:         General: No tenderness. Normal range of motion.      Cervical back: Normal range of motion and neck supple. No muscular tenderness.   Lymphadenopathy:      Head:      Right side of head: No submental, submandibular, tonsillar, preauricular, posterior auricular or occipital adenopathy.      Left side of head: No submental, submandibular, tonsillar, preauricular, posterior auricular or occipital adenopathy.      Cervical: No cervical adenopathy.      Upper Body:      Right upper body: No supraclavicular adenopathy.      Left upper body: No supraclavicular adenopathy.   Skin:     General: Skin is warm and dry.      Coloration: Skin is not pale.      Findings: No erythema or rash. "   Neurological:      Mental Status: He is alert and oriented to person, place, and time.   Psychiatric:         Mood and Affect: Mood normal.         Behavior: Behavior normal.         CT-RENAL COLIC EVALUATION(A/P W/O)    Result Date: 7/5/2021 7/5/2021 11:38 AM HISTORY/REASON FOR EXAM:  Flank pain, kidney stone suspected; renal colic. Left flank pain TECHNIQUE/EXAM DESCRIPTION AND NUMBER OF VIEWS: CT scan renal/colic without contrast. 5 mm helical images of the abdomen and pelvis were obtained from the diaphragmatic domes through the pubic symphysis. Low dose optimization technique was utilized for this CT exam including automated exposure control and adjustment of the mA and/or kV according to patient size. COMPARISON: None. FINDINGS: Lung bases: Unremarkable. Kidneys: There is abnormal contour of the left kidney with a subtle heterogeneous mass measuring approximately 4.1 x 4.1 x 3.8 cm. There is surrounding perinephric stranding. No hydronephrosis or hydroureter. No renal or ureteral calculi. Bladder: Unremarkable Liver: Unremarkable. Spleen: The spleen measures approximately 14 cm. A splenule is in the left upper quadrant. Adrenal glands: Unremarkable. Pancreas: Unremarkable. Biliary system: Unremarkable. Bowel: Unremarkable. No pneumoperitoneum is identified. Ascites: None. Lymph nodes: No adenopathy. Bones: Unremarkable. Miscellaneous:     1.  Subtle heterogeneous mass in the upper pole of the left kidney does not have attenuation characteristics suggestive of abscess. Findings are concerning for neoplasm. Dedicated renal protocol CT or MRI is recommended for further evaluation. 2.  No adenopathy is identified. No contour abnormalities appreciated in the left renal vein. 3.  Mild splenomegaly          Assessment/Plan:       1. Renal mass  CT-RENAL WITH & W/O    REFERRAL TO UROLOGY       Patient noted to have a subtle heterogeneous mass in the upper pole of the left kidney.  According to the reading  radiologist this is concerning for neoplasm, however it was recommended that a dedicated renal protocol CT be completed for further evaluation.  I discussed the recommendation in detail with the patient today, and requested a CT renal protocol imaging.  Discussed with patient should this come back concerning for renal cell carcinoma, recommendation would be to refer patient to urology for further evaluation.  I will contact patient via phone once CT is completed to determine further plan of care.  Patient did verbalize understanding and is in agreement with the plan.    Recommend that patient establish care with primary care provider.  I have provided him with [facility] phone number to try and establish care with a PCP.  Patient appreciative of the information.      Please note that this dictation was created using voice recognition software. I have made every reasonable attempt to correct obvious errors, but I expect that there are errors of grammar and possibly content that I did not discover before finalizing the note.        Addendum: Patient completed the renal protocol CT.  CT shows a 3.5 cm in diameter solid enhancing mass in the medial aspect of the upper pole of the left kidney, and according to the reading radiologist this is suspicious for a renal cell carcinoma.  There is no perinephric or retroperitoneal adenopathy.  There is no evidence of a renal vein thrombus.  Patient also noted to have a small left-sided hernia containing fat.    Contacted patient via phone to discuss the CT findings.  At this time I will go ahead and proceed with a referral to urology for further evaluation of this concerning solid enhancing mass on the left kidney.  Patient verbalized understanding is in agreement the plan.    There is no further follow-up needed with [facility].

## 2021-07-13 ENCOUNTER — TELEPHONE (OUTPATIENT)
Dept: HEMATOLOGY ONCOLOGY | Facility: MEDICAL CENTER | Age: 36
End: 2021-07-13

## 2021-07-13 NOTE — TELEPHONE ENCOUNTER
Authorization for CT renal protocol received by Dr. Velazco with insurance:    Approved - auth # [id number] - good 180 calendar days

## 2022-04-19 ENCOUNTER — OFFICE VISIT (OUTPATIENT)
Dept: URGENT CARE | Facility: PHYSICIAN GROUP | Age: 37
End: 2022-04-19
Payer: COMMERCIAL

## 2022-04-19 VITALS
DIASTOLIC BLOOD PRESSURE: 76 MMHG | HEIGHT: 68 IN | RESPIRATION RATE: 18 BRPM | SYSTOLIC BLOOD PRESSURE: 122 MMHG | BODY MASS INDEX: 27.28 KG/M2 | HEART RATE: 80 BPM | OXYGEN SATURATION: 99 % | TEMPERATURE: 98 F | WEIGHT: 180 LBS

## 2022-04-19 DIAGNOSIS — N50.89 TESTICULAR LUMP: ICD-10-CM

## 2022-04-19 PROCEDURE — 99213 OFFICE O/P EST LOW 20 MIN: CPT | Performed by: PHYSICIAN ASSISTANT

## 2022-04-19 ASSESSMENT — PAIN SCALES - GENERAL: PAINLEVEL: NO PAIN

## 2022-04-23 PROBLEM — C64.9 MALIGNANT TUMOR OF KIDNEY (HCC): Status: ACTIVE | Noted: 2021-12-15

## 2022-04-23 PROBLEM — N28.89 RENAL MASS, LEFT: Status: ACTIVE | Noted: 2021-12-09

## 2022-04-23 RX ORDER — ALUMINUM CHLORIDE 20 %
SOLUTION, NON-ORAL TOPICAL
COMMUNITY

## 2022-04-23 ASSESSMENT — ENCOUNTER SYMPTOMS
WEIGHT LOSS: 0
FEVER: 0
NERVOUS/ANXIOUS: 1
SHORTNESS OF BREATH: 0
SORE THROAT: 0
VOMITING: 0
NAUSEA: 0
ABDOMINAL PAIN: 0
CHILLS: 0

## 2022-04-23 NOTE — PROGRESS NOTES
"Subjective     Quincy Omer is a 37 y.o. male who presents with Bump    HPI:  Quincy Omer is a 37 y.o. male who presents today for evaluation of a testicular lump.  Patient reports that he noticed a lump on his left testicle earlier today while doing a self-exam.  He has not had any testicular pain or swelling.  No fever/chills.  No unexplained weight loss.  He does have a history of kidney cancer, however, and is concerned about possibility of testicular cancer after finding the lump.        Review of Systems   Constitutional: Negative for chills, fever and weight loss.   HENT: Negative for sore throat.    Respiratory: Negative for shortness of breath.    Cardiovascular: Negative for chest pain.   Gastrointestinal: Negative for abdominal pain, nausea and vomiting.   Genitourinary:        Lump on left testicle   Musculoskeletal: Negative for joint pain.   Skin: Negative for rash.   Psychiatric/Behavioral: The patient is nervous/anxious.            PMH:  has a past medical history of IBS (irritable bowel syndrome) (2010).  MEDS:   Current Outpatient Medications:   •  sertraline (ZOLOFT) 100 MG Tab, , Disp: , Rfl:   •  traZODone (DESYREL) 100 MG Tab, Take 100 mg by mouth., Disp: , Rfl:   •  hydrOXYzine HCl (ATARAX) 50 MG Tab, Take 50 mg by mouth 3 times a day as needed for Itching., Disp: , Rfl:   ALLERGIES: No Known Allergies  SURGHX: No past surgical history on file.  SOCHX:  reports that he quit smoking about 5 years ago. His smoking use included cigarettes. He has a 11.25 pack-year smoking history. He quit smokeless tobacco use about 11 years ago.  His smokeless tobacco use included chew. He reports current alcohol use. He reports current drug use. Drug: Marijuana.  FH: Family history was reviewed, no pertinent findings to report      Objective     /76   Pulse 80   Temp 36.7 °C (98 °F)   Resp 18   Ht 1.727 m (5' 8\")   Wt 81.6 kg (180 lb)   SpO2 99%   BMI 27.37 kg/m²      Physical " Exam  Constitutional:       General: He is not in acute distress.     Appearance: He is not diaphoretic.   HENT:      Head: Normocephalic and atraumatic.      Right Ear: External ear normal.      Left Ear: External ear normal.   Eyes:      Conjunctiva/sclera: Conjunctivae normal.      Pupils: Pupils are equal, round, and reactive to light.   Pulmonary:      Effort: Pulmonary effort is normal. No respiratory distress.   Genitourinary:     Penis: Normal.       Testes: Cremasteric reflex is present.         Left: Mass present.      Epididymis:      Right: Normal.      Left: Normal.      Comments: There is a small palpable mass at the superior/anterior aspect of the left testicle that feels to be about the size of a pinhead.  It is nontender.  There is no testicular pain or swelling.  Musculoskeletal:      Cervical back: Normal range of motion.   Skin:     Findings: No rash.   Neurological:      Mental Status: He is alert and oriented to person, place, and time.   Psychiatric:         Mood and Affect: Mood and affect normal.         Cognition and Memory: Memory normal.         Judgment: Judgment normal.           Assessment & Plan     1. Testicular lump  - [id number]; Future  Ultrasound ordered to further evaluate the testicular lump palpated.  No openings today.  Patient will call and schedule at his convenience.  We will follow-up with him when I receive the results.              Differential Diagnosis, natural history, and supportive care discussed. Return to the Urgent Care or follow up with your PCP if symptoms fail to resolve, or for any new or worsening symptoms. Emergency room precautions discussed. Patient and/or family appears understanding of information.

## 2022-04-28 ENCOUNTER — HOSPITAL ENCOUNTER (OUTPATIENT)
Dept: RADIOLOGY | Facility: MEDICAL CENTER | Age: 37
End: 2022-04-28
Attending: PHYSICIAN ASSISTANT
Payer: COMMERCIAL

## 2022-04-28 DIAGNOSIS — N50.89 TESTICULAR LUMP: ICD-10-CM

## 2022-04-28 PROCEDURE — 76870 US EXAM SCROTUM: CPT

## 2022-05-28 ENCOUNTER — HOSPITAL ENCOUNTER (OUTPATIENT)
Dept: RADIOLOGY | Facility: MEDICAL CENTER | Age: 37
End: 2022-05-28
Attending: UROLOGY
Payer: COMMERCIAL

## 2022-05-28 ENCOUNTER — HOSPITAL ENCOUNTER (OUTPATIENT)
Dept: LAB | Facility: MEDICAL CENTER | Age: 37
End: 2022-05-28
Attending: UROLOGY
Payer: COMMERCIAL

## 2022-05-28 DIAGNOSIS — C64.9 MALIGNANT NEOPLASM OF KIDNEY, UNSPECIFIED LATERALITY (HCC): ICD-10-CM

## 2022-05-28 LAB
ALBUMIN SERPL BCP-MCNC: 4.9 G/DL (ref 3.2–4.9)
ALBUMIN/GLOB SERPL: 2 G/DL
ALP SERPL-CCNC: 110 U/L (ref 30–99)
ALT SERPL-CCNC: 17 U/L (ref 2–50)
ANION GAP SERPL CALC-SCNC: 12 MMOL/L (ref 7–16)
AST SERPL-CCNC: 20 U/L (ref 12–45)
BILIRUB SERPL-MCNC: 0.8 MG/DL (ref 0.1–1.5)
BUN SERPL-MCNC: 12 MG/DL (ref 8–22)
CALCIUM SERPL-MCNC: 9.6 MG/DL (ref 8.5–10.5)
CHLORIDE SERPL-SCNC: 105 MMOL/L (ref 96–112)
CO2 SERPL-SCNC: 23 MMOL/L (ref 20–33)
CREAT SERPL-MCNC: 1.27 MG/DL (ref 0.5–1.4)
GFR SERPLBLD CREATININE-BSD FMLA CKD-EPI: 75 ML/MIN/1.73 M 2
GLOBULIN SER CALC-MCNC: 2.4 G/DL (ref 1.9–3.5)
GLUCOSE SERPL-MCNC: 91 MG/DL (ref 65–99)
POTASSIUM SERPL-SCNC: 4.4 MMOL/L (ref 3.6–5.5)
PROT SERPL-MCNC: 7.3 G/DL (ref 6–8.2)
SODIUM SERPL-SCNC: 140 MMOL/L (ref 135–145)

## 2022-05-28 PROCEDURE — 36415 COLL VENOUS BLD VENIPUNCTURE: CPT

## 2022-05-28 PROCEDURE — 80053 COMPREHEN METABOLIC PANEL: CPT

## 2022-05-28 PROCEDURE — 700117 HCHG RX CONTRAST REV CODE 255: Performed by: UROLOGY

## 2022-05-28 PROCEDURE — A9576 INJ PROHANCE MULTIPACK: HCPCS | Performed by: UROLOGY

## 2022-05-28 PROCEDURE — 74183 MRI ABD W/O CNTR FLWD CNTR: CPT

## 2022-05-28 RX ADMIN — GADOTERIDOL 17 ML: 279.3 INJECTION, SOLUTION INTRAVENOUS at 15:19

## 2022-11-09 ENCOUNTER — APPOINTMENT (OUTPATIENT)
Dept: RADIOLOGY | Facility: MEDICAL CENTER | Age: 37
End: 2022-11-09
Attending: UROLOGY
Payer: COMMERCIAL

## 2022-11-09 DIAGNOSIS — C64.9 RENAL CELL CARCINOMA, UNSPECIFIED LATERALITY (HCC): ICD-10-CM

## 2022-11-09 PROCEDURE — 700117 HCHG RX CONTRAST REV CODE 255: Performed by: UROLOGY

## 2022-11-09 PROCEDURE — 74183 MRI ABD W/O CNTR FLWD CNTR: CPT

## 2022-11-09 PROCEDURE — A9576 INJ PROHANCE MULTIPACK: HCPCS | Performed by: UROLOGY

## 2022-11-09 RX ADMIN — GADOTERIDOL 15 ML: 279.3 INJECTION, SOLUTION INTRAVENOUS at 08:59

## 2022-11-19 ENCOUNTER — HOSPITAL ENCOUNTER (OUTPATIENT)
Dept: LAB | Facility: MEDICAL CENTER | Age: 37
End: 2022-11-19
Attending: UROLOGY
Payer: COMMERCIAL

## 2022-11-19 LAB
ALBUMIN SERPL BCP-MCNC: 4.5 G/DL (ref 3.2–4.9)
ALBUMIN/GLOB SERPL: 1.8 G/DL
ALP SERPL-CCNC: 105 U/L (ref 30–99)
ALT SERPL-CCNC: 13 U/L (ref 2–50)
ANION GAP SERPL CALC-SCNC: 8 MMOL/L (ref 7–16)
AST SERPL-CCNC: 17 U/L (ref 12–45)
BILIRUB SERPL-MCNC: 0.5 MG/DL (ref 0.1–1.5)
BUN SERPL-MCNC: 11 MG/DL (ref 8–22)
CALCIUM SERPL-MCNC: 9.3 MG/DL (ref 8.5–10.5)
CHLORIDE SERPL-SCNC: 104 MMOL/L (ref 96–112)
CO2 SERPL-SCNC: 24 MMOL/L (ref 20–33)
CREAT SERPL-MCNC: 1.27 MG/DL (ref 0.5–1.4)
GFR SERPLBLD CREATININE-BSD FMLA CKD-EPI: 75 ML/MIN/1.73 M 2
GLOBULIN SER CALC-MCNC: 2.5 G/DL (ref 1.9–3.5)
GLUCOSE SERPL-MCNC: 84 MG/DL (ref 65–99)
POTASSIUM SERPL-SCNC: 4.4 MMOL/L (ref 3.6–5.5)
PROT SERPL-MCNC: 7 G/DL (ref 6–8.2)
SODIUM SERPL-SCNC: 136 MMOL/L (ref 135–145)

## 2022-11-19 PROCEDURE — 36415 COLL VENOUS BLD VENIPUNCTURE: CPT

## 2022-11-19 PROCEDURE — 80053 COMPREHEN METABOLIC PANEL: CPT

## 2022-11-22 ENCOUNTER — HOSPITAL ENCOUNTER (OUTPATIENT)
Dept: RADIOLOGY | Facility: MEDICAL CENTER | Age: 37
End: 2022-11-22
Attending: UROLOGY
Payer: COMMERCIAL

## 2022-11-22 DIAGNOSIS — C64.9 RENAL CELL CARCINOMA, UNSPECIFIED LATERALITY (HCC): ICD-10-CM

## 2022-11-22 PROCEDURE — 71046 X-RAY EXAM CHEST 2 VIEWS: CPT

## 2022-12-12 ENCOUNTER — NON-PROVIDER VISIT (OUTPATIENT)
Dept: URGENT CARE | Facility: PHYSICIAN GROUP | Age: 37
End: 2022-12-12

## 2022-12-12 DIAGNOSIS — Z02.1 PRE-EMPLOYMENT DRUG SCREENING: ICD-10-CM

## 2022-12-12 LAB
AMP AMPHETAMINE: NORMAL
COC COCAINE: NORMAL
INT CON NEG: NORMAL
INT CON POS: NORMAL
MET METHAMPHETAMINES: NORMAL
OPI OPIATES: NORMAL
PCP PHENCYCLIDINE: NORMAL
POC DRUG COMMENT 753798-OCCUPATIONAL HEALTH: NORMAL
THC: NORMAL

## 2022-12-12 PROCEDURE — 80305 DRUG TEST PRSMV DIR OPT OBS: CPT | Performed by: NURSE PRACTITIONER

## 2023-11-20 ENCOUNTER — HOSPITAL ENCOUNTER (OUTPATIENT)
Dept: RADIOLOGY | Facility: MEDICAL CENTER | Age: 38
End: 2023-11-20
Attending: UROLOGY
Payer: COMMERCIAL

## 2023-11-20 DIAGNOSIS — C64.9 MALIGNANT NEOPLASM OF KIDNEY, UNSPECIFIED LATERALITY (HCC): ICD-10-CM

## 2023-11-20 PROCEDURE — 71046 X-RAY EXAM CHEST 2 VIEWS: CPT

## 2023-11-20 PROCEDURE — A9579 GAD-BASE MR CONTRAST NOS,1ML: HCPCS | Performed by: UROLOGY

## 2023-11-20 PROCEDURE — 700117 HCHG RX CONTRAST REV CODE 255: Performed by: UROLOGY

## 2023-11-20 PROCEDURE — 74183 MRI ABD W/O CNTR FLWD CNTR: CPT

## 2023-11-20 RX ADMIN — GADOTERIDOL 16 ML: 279.3 INJECTION, SOLUTION INTRAVENOUS at 08:52

## 2025-08-05 ENCOUNTER — APPOINTMENT (OUTPATIENT)
Dept: RADIOLOGY | Facility: MEDICAL CENTER | Age: 40
End: 2025-08-05
Attending: PHYSICIAN ASSISTANT
Payer: COMMERCIAL

## 2025-08-26 ENCOUNTER — HOSPITAL ENCOUNTER (OUTPATIENT)
Dept: RADIOLOGY | Facility: MEDICAL CENTER | Age: 40
End: 2025-08-26
Attending: PHYSICIAN ASSISTANT
Payer: COMMERCIAL

## 2025-08-26 DIAGNOSIS — Z85.528 PERSONAL HISTORY OF MALIGNANT NEOPLASM OF KIDNEY: ICD-10-CM

## 2025-08-26 PROCEDURE — 700117 HCHG RX CONTRAST REV CODE 255: Mod: JZ | Performed by: PHYSICIAN ASSISTANT

## 2025-08-26 PROCEDURE — A9579 GAD-BASE MR CONTRAST NOS,1ML: HCPCS | Mod: JZ | Performed by: PHYSICIAN ASSISTANT

## 2025-08-26 PROCEDURE — 74183 MRI ABD W/O CNTR FLWD CNTR: CPT

## 2025-08-26 RX ORDER — GADOTERIDOL 279.3 MG/ML
17 INJECTION INTRAVENOUS ONCE
Status: COMPLETED | OUTPATIENT
Start: 2025-08-26 | End: 2025-08-26

## 2025-08-26 RX ADMIN — GADOTERIDOL 17 ML: 279.3 INJECTION, SOLUTION INTRAVENOUS at 15:59
